# Patient Record
Sex: MALE | Race: WHITE | URBAN - METROPOLITAN AREA
[De-identification: names, ages, dates, MRNs, and addresses within clinical notes are randomized per-mention and may not be internally consistent; named-entity substitution may affect disease eponyms.]

---

## 2018-04-04 ENCOUNTER — HISTORICAL (OUTPATIENT)
Dept: LAB | Facility: HOSPITAL | Age: 73
End: 2018-04-04

## 2018-04-04 LAB
BUN SERPL-MCNC: 18 MG/DL (ref 7–18)
CALCIUM SERPL-MCNC: 8.6 MG/DL (ref 8.5–10.1)
CHLORIDE SERPL-SCNC: 109 MMOL/L (ref 98–107)
CO2 SERPL-SCNC: 29 MMOL/L (ref 21–32)
CREAT SERPL-MCNC: 1.1 MG/DL (ref 0.7–1.3)
CREAT/UREA NIT SERPL: 16.4
GLUCOSE SERPL-MCNC: 96 MG/DL (ref 74–106)
POTASSIUM SERPL-SCNC: 4.2 MMOL/L (ref 3.5–5.1)
SODIUM SERPL-SCNC: 143 MMOL/L (ref 136–145)
TSH SERPL-ACNC: 1.9 MIU/ML (ref 0.36–3.74)

## 2018-04-11 ENCOUNTER — HISTORICAL (OUTPATIENT)
Dept: ONCOLOGY | Facility: HOSPITAL | Age: 73
End: 2018-04-11

## 2022-04-30 NOTE — DISCHARGE SUMMARY
DISCHARGE DATE:  04/12/2018    DISCHARGE DIAGNOSES:    1. Malfunctioning ventricular pacemaker, successfully explanted.  2. Exertional chest pain with normal left heart catheterization.  3. Paroxysmal atrial fibrillation.    DISCHARGE MEDICATIONS:    Please see Med Rec.  Flecainide will be discontinued.  Eliquis will be resumed and metoprolol ER, 25 mg daily will be initiated.    SUMMARY:  The patient is a 72-year-old male with several episodes of exertional chest pain, which occurred while turkey hunting.  The patient had a past history of ventricular pacemaker insertion in 1984 for presumed sick sinus syndrome.  Although the generator is effective, life span had ended several years prior.  He was noted to have intermittent pacing spikes with no inhibition.  An attempt was made by the [employer] representatives to terminate the pacing function, but this could not be done secondary to the age of the system.  He therefore underwent explantation of the device.  He is not thought to require pacing at this time.  The following day, he underwent cardiac catheterization to assess his reproducible exertional chest discomfort.  This study revealed clean coronaries with normal ventricular function.    DISCHARGE INSTRUCTIONS:  He will be seen in my office in six days of staple removal and will be referred to Dr. Lul Sahu for sustained atrial fibrillation.        ______________________________  MD CHRISTELLE Brown/ELSI  DD:  04/12/2018  Time:  08:49AM  DT:  04/13/2018  Time:  01:09PM  Job #:  [id number]    cc: Lul Sahu MD